# Patient Record
Sex: FEMALE | Race: WHITE | URBAN - METROPOLITAN AREA
[De-identification: names, ages, dates, MRNs, and addresses within clinical notes are randomized per-mention and may not be internally consistent; named-entity substitution may affect disease eponyms.]

---

## 2021-01-01 ENCOUNTER — EMERGENCY (EMERGENCY)
Facility: HOSPITAL | Age: 66
LOS: 1 days | End: 2021-01-01
Attending: STUDENT IN AN ORGANIZED HEALTH CARE EDUCATION/TRAINING PROGRAM | Admitting: STUDENT IN AN ORGANIZED HEALTH CARE EDUCATION/TRAINING PROGRAM
Payer: MEDICARE

## 2021-01-01 LAB
BASE EXCESS BLDV CALC-SCNC: -12 MMOL/L — LOW (ref -3–2)
BLOOD GAS VENOUS - CREATININE: 0.6 MG/DL — SIGNIFICANT CHANGE UP (ref 0.5–1.3)
BLOOD GAS VENOUS COMPREHENSIVE RESULT: SIGNIFICANT CHANGE UP
CHLORIDE BLDV-SCNC: 107 MMOL/L — SIGNIFICANT CHANGE UP (ref 96–108)
GAS PNL BLDV: 130 MMOL/L — LOW (ref 136–146)
GLUCOSE BLDV-MCNC: 257 MG/DL — HIGH (ref 70–99)
HCO3 BLDV-SCNC: 12 MMOL/L — LOW (ref 20–27)
HCT VFR BLDA CALC: 36.6 % — SIGNIFICANT CHANGE UP (ref 34.5–46.5)
HGB BLD CALC-MCNC: 11.9 G/DL — SIGNIFICANT CHANGE UP (ref 11.5–15.5)
LACTATE BLDV-MCNC: 8.8 MMOL/L — CRITICAL HIGH (ref 0.5–2)
PCO2 BLDV: 75 MMHG — HIGH (ref 41–51)
PH BLDV: 7 — CRITICAL LOW (ref 7.32–7.43)
PO2 BLDV: 36 MMHG — SIGNIFICANT CHANGE UP (ref 35–40)
POTASSIUM BLDV-SCNC: 4.4 MMOL/L — SIGNIFICANT CHANGE UP (ref 3.4–4.5)
SAO2 % BLDV: 38.5 % — LOW (ref 60–85)

## 2021-01-01 PROCEDURE — 99291 CRITICAL CARE FIRST HOUR: CPT | Mod: 25

## 2021-01-01 PROCEDURE — 31500 INSERT EMERGENCY AIRWAY: CPT

## 2021-01-01 PROCEDURE — 36556 INSERT NON-TUNNEL CV CATH: CPT

## 2021-01-08 NOTE — ED PROVIDER NOTE - CLINICAL SUMMARY MEDICAL DECISION MAKING FREE TEXT BOX
64 y/o F w/ reported [facility] brain cancer BIBEMS initially called for respiratory distress, found to be hypoxic SpO2 30s with  on EMS arrival and in severe respiratory distress, started on 15L nonrebreather, lost pulses en route to hospital CPR initiated. Here patient found to be in pulseless vfib upon initial pulse check, defibrillated, pt intubated and R tibia IO and R fem central line placed, epi 1mg x5, bicarb amp x2, calcium chloride 1g x1. Unable to reverse patient's hypoxia with all subsequent pulse checks with asystole, no pulses palpated. Spoke with family who reports pt is DNR/DNI, resuscitative efforts ended, pronounced 1044am.

## 2021-01-08 NOTE — ED PROVIDER NOTE - CRITICAL CARE PROVIDED
direct patient care (not related to procedure)/additional history taking/documentation/consult w/ pt's family directly relating to pts condition/telephone consultation with the patient's family

## 2021-01-08 NOTE — ED ADULT NURSE NOTE - OBJECTIVE STATEMENT
66 y/o female presents to ED in cardiac arrest.  Pt arrived via [facility], limited information available when pt arrived.  Pt was being transported for SOB and became unresponsive enroute, EMS was BLS crew and placed AED, no shock advised, pt arrived with chest compressions in progress at 1015.  Pt placed on  Zoll pads, noted to be in VF, pt defibrillated x 1, post defib rhythm asystole.  Pt intubated by Dr. Montiel, see code sheet for details.  Dr Coppola communicated with family via phone and was informed that pt was supposed to be taken to another facility and had advanced directives and was a DNR/DNI.  Resuscitation efforts stopped, pt was extubated and TOD was called at 1044 by Dr. Coppola.  Pt cleaned all lines removed and pt prepared for viewing by family.

## 2021-01-08 NOTE — ED ADULT NURSE REASSESSMENT NOTE - NS ED NURSE REASSESS COMMENT FT1
1220:  pt's  at bedside, emotional support given.  Spouse left at approx 1225 stating that he wanted to go make  arrangements.

## 2021-01-08 NOTE — ED PROVIDER NOTE - PHYSICAL EXAMINATION
*GEN:   unconscious, unresponsive  *EYES:   pupils bilat nonreactive to light  *HEENT:   airway clear, atraumatic  *CV:   pulseless  *RESP:   apneic  *ABD:   soft, non-tender  *SKIN:   dry, intact  *NEURO:   unconscious, unresponsive

## 2021-01-08 NOTE — ED PROCEDURE NOTE - CPROC ED TIME OUT STATEMENT1
“Patient's name, , procedure and correct site were confirmed during the [place] Timeout.”
“Patient's name, , procedure and correct site were confirmed during the [place] Timeout.”
“Patient's name, , procedure and correct site were confirmed during the [place] Timeout.”

## 2021-01-08 NOTE — ED PROVIDER NOTE - OBJECTIVE STATEMENT
64 y/o F w/ reported [facility] brain cancer BIBEMS initially called for respiratory distress, found to be hypoxic SpO2 30s with  on EMS arrival and in severe respiratory distress, started on 15L nonrebreather. En route to hospital 10 minutes prior to arrival pt lost pulses, unsure of initial rhythm, CPR initiated. EMS did not initiate airway management or provide meds as BLS. Here patient found to be in pulseless vfib upon initial pulse check, defibrillated, then all subsequent pulse checks with asystole, no pulses palpated.

## 2021-01-08 NOTE — ED ADULT TRIAGE NOTE - CHIEF COMPLAINT QUOTE
Pt presents to ED as a notification for cardiac arrest. Pt originally called for SOB, hypoxic to 40's in field as per EMS. Compression started en route. Pt taken straight to [facility].

## 2021-01-08 NOTE — ED PROVIDER NOTE - ATTENDING CONTRIBUTION TO CARE
Abdon MAI: I agree with the above provided history and exam    65F w/ reported [facility] brain cancer - bib EMS ([facility] ambulance) for respiratory distress and cardiac arrest. Per EMS, patient picked up from home and history provided by patient's aide was that she had respiratory distress today, no other history provided by EMS. EMS reports about 10 minutes prior to patient's arrival to [facility] ED lost pulses (was previously ), started compressions. EMS did not initiate airway management or provide meds. Here patient found to be in pulseless vtach upon initial pulse check, defibrillated, then all subsequent pulse checks with asystole, no pulses palpated. Patient was intubated, and R femoral line placed, however remained hypoxic to 20% while bagged, bedside echo showing minimal to no cardiac activity. Multiple doses epi, bicarb given. 1 dose calcium given.     I Wu Coppola MD performed a history and physical exam of the patient and discussed their management with the resident and /or advanced care provider. I reviewed the resident and /or ACP's note and agree with the documented findings and plan of care. My medical decision making and observations are found above.

## 2021-01-08 NOTE — ED PROCEDURE NOTE - NS ED ATTENDING STATEMENT MOD
I have personally seen and examined this patient.  I have fully participated in the care of this patient. I have reviewed all pertinent clinical information, including history, physical exam, plan and the Resident’s note and agree except as noted.
Ears: no ear pain and no hearing problems.Nose: no nasal congestion and no nasal drainage.Mouth/Throat: no dysphagia, no hoarseness and no throat pain.Neck: no lumps, no pain, no stiffness and no swollen glands.

## 2021-01-08 NOTE — ED PROVIDER NOTE - CHIEF COMPLAINT
The patient is a 65y Female complaining of  The patient is a 65y Female complaining of cardiac arrest

## 2021-01-08 NOTE — ED PROVIDER NOTE - PROGRESS NOTE DETAILS
Wu Coppola MD: I called patient's sister who reported that patient is DNR/DNI (previously not reported by EMS or known to our team), and requested extubation. Family member reported significant distress that the patient was not brought to her usual hospital setting ([facility]), which she reports was previously requested of EMS. Because of this, we immediately extubated the patient, stopped chest compressions, and I pronounced patient  at 10:44. Family member coming to ED now Wu Coppola MD: case offered to ME but was denied and no case # generated

## 2021-01-08 NOTE — PROVIDER CONTACT NOTE (OTHER) - ASSESSMENT
SWR met with RNMoni and Resident regarding pt, pt pronounced dead at 10:44am once pt was confirmed DNR/DNI by family.  SWR met with pt spouse in family room and provided him with support, water.  Spouse requested to see pt and have a .  SWR brought spouse to pt room and resident provided spouse with medical information.  SWR attempted to reach the chaplains and the father, however there was no answer, the hospital  also made multiple attempts without success.  SWR went to  office without success.  Spouse thanked staff for their efforts however, he wanted to leave to start making arrangements.  No other family to view pt.  Spouse stated he was aware of how to proceed next.  No other SW intervention needed.

## 2021-01-08 NOTE — PROVIDER CONTACT NOTE (OTHER) - BACKGROUND
Pt was a 64 yo female presenting to [facility] ED in cardiac arrest via senior [facility], who started CPR.

## 2021-01-08 NOTE — ED PROCEDURE NOTE - ATTENDING CONTRIBUTION TO CARE
Abdon MAI: I was present for and supervised the above procedure.